# Patient Record
Sex: MALE | Race: OTHER | Employment: FULL TIME | URBAN - METROPOLITAN AREA
[De-identification: names, ages, dates, MRNs, and addresses within clinical notes are randomized per-mention and may not be internally consistent; named-entity substitution may affect disease eponyms.]

---

## 2017-08-03 ENCOUNTER — HOSPITAL ENCOUNTER (OUTPATIENT)
Dept: GENERAL RADIOLOGY | Age: 37
Discharge: HOME | End: 2017-08-03
Attending: FAMILY MEDICINE
Payer: COMMERCIAL

## 2017-08-03 ENCOUNTER — OFFICE VISIT (OUTPATIENT)
Dept: FAMILY MEDICINE CLINIC | Facility: CLINIC | Age: 37
End: 2017-08-03

## 2017-08-03 VITALS
TEMPERATURE: 99 F | HEART RATE: 60 BPM | DIASTOLIC BLOOD PRESSURE: 79 MMHG | SYSTOLIC BLOOD PRESSURE: 124 MMHG | WEIGHT: 167 LBS

## 2017-08-03 DIAGNOSIS — M54.41 RIGHT-SIDED LOW BACK PAIN WITH RIGHT-SIDED SCIATICA, UNSPECIFIED CHRONICITY: ICD-10-CM

## 2017-08-03 DIAGNOSIS — J02.0 STREP THROAT: Primary | ICD-10-CM

## 2017-08-03 LAB
CONTROL LINE PRESENT WITH A CLEAR BACKGROUND (YES/NO): YES YES/NO
KIT LOT #: NORMAL NUMERIC
STREP GRP A CUL-SCR: POSITIVE

## 2017-08-03 PROCEDURE — 87880 STREP A ASSAY W/OPTIC: CPT | Performed by: FAMILY MEDICINE

## 2017-08-03 PROCEDURE — 99212 OFFICE O/P EST SF 10 MIN: CPT | Performed by: FAMILY MEDICINE

## 2017-08-03 PROCEDURE — 99203 OFFICE O/P NEW LOW 30 MIN: CPT | Performed by: FAMILY MEDICINE

## 2017-08-03 PROCEDURE — 72100 X-RAY EXAM L-S SPINE 2/3 VWS: CPT | Performed by: FAMILY MEDICINE

## 2017-08-03 RX ORDER — PENICILLIN V POTASSIUM 500 MG/1
500 TABLET ORAL 3 TIMES DAILY
Qty: 30 TABLET | Refills: 0 | Status: SHIPPED | OUTPATIENT
Start: 2017-08-03 | End: 2017-11-15

## 2017-08-03 RX ORDER — METHYLPREDNISOLONE 4 MG/1
TABLET ORAL
Qty: 1 KIT | Refills: 1 | Status: SHIPPED | OUTPATIENT
Start: 2017-08-03 | End: 2017-11-15

## 2017-08-03 NOTE — PROGRESS NOTES
Blood pressure 124/79, pulse 60, temperature 99.1 °F (37.3 °C), temperature source Oral, weight 167 lb (75.8 kg). Patient presents today complaining of a sore throat for 2 weeks. Some cough. No nasal congestion. Denies allergies.     Patient also comp

## 2017-08-07 ENCOUNTER — TELEPHONE (OUTPATIENT)
Dept: FAMILY MEDICINE CLINIC | Facility: CLINIC | Age: 37
End: 2017-08-07

## 2017-08-07 NOTE — TELEPHONE ENCOUNTER
----- Message from Sarah Reyes DO sent at 8/3/2017  5:57 PM CDT -----  Back x-ray normal patient to fu if no improvement with medication/PT.

## 2017-11-10 ENCOUNTER — TELEPHONE (OUTPATIENT)
Dept: FAMILY MEDICINE CLINIC | Facility: CLINIC | Age: 37
End: 2017-11-10

## 2017-11-10 DIAGNOSIS — K64.9 HEMORRHOIDS, UNSPECIFIED HEMORRHOID TYPE: Primary | ICD-10-CM

## 2017-11-15 ENCOUNTER — OFFICE VISIT (OUTPATIENT)
Dept: FAMILY MEDICINE CLINIC | Facility: CLINIC | Age: 37
End: 2017-11-15

## 2017-11-15 ENCOUNTER — TELEPHONE (OUTPATIENT)
Dept: OTHER | Age: 37
End: 2017-11-15

## 2017-11-15 VITALS
DIASTOLIC BLOOD PRESSURE: 75 MMHG | WEIGHT: 169 LBS | SYSTOLIC BLOOD PRESSURE: 121 MMHG | HEART RATE: 53 BPM | HEIGHT: 64 IN | BODY MASS INDEX: 28.85 KG/M2

## 2017-11-15 DIAGNOSIS — Z00.00 ROUTINE GENERAL MEDICAL EXAMINATION AT A HEALTH CARE FACILITY: Primary | ICD-10-CM

## 2017-11-15 DIAGNOSIS — R06.02 SHORTNESS OF BREATH: ICD-10-CM

## 2017-11-15 DIAGNOSIS — K64.9 HEMORRHOIDS, UNSPECIFIED HEMORRHOID TYPE: Primary | ICD-10-CM

## 2017-11-15 DIAGNOSIS — G89.29 CHRONIC BILATERAL THORACIC BACK PAIN: ICD-10-CM

## 2017-11-15 DIAGNOSIS — R53.83 FATIGUE, UNSPECIFIED TYPE: ICD-10-CM

## 2017-11-15 DIAGNOSIS — M54.6 CHRONIC BILATERAL THORACIC BACK PAIN: ICD-10-CM

## 2017-11-15 PROCEDURE — 99395 PREV VISIT EST AGE 18-39: CPT | Performed by: PHYSICIAN ASSISTANT

## 2017-11-15 PROCEDURE — G0438 PPPS, INITIAL VISIT: HCPCS | Performed by: PHYSICIAN ASSISTANT

## 2017-11-15 RX ORDER — CYCLOBENZAPRINE HCL 10 MG
10 TABLET ORAL 3 TIMES DAILY
Qty: 30 TABLET | Refills: 0 | Status: SHIPPED | OUTPATIENT
Start: 2017-11-15 | End: 2017-12-05

## 2017-11-15 NOTE — TELEPHONE ENCOUNTER
Please note/advise. Thank you. Please respond to pool: WYATT PROCTORO LPN/MC    Pt wife Dmitry Field) calling on pt's behalf (No HIPAA on file) stating that the pt saw the GI specialist and was told that the pt should get a surgical consult for his hemorrhoids.     Thamas Necessary

## 2017-11-15 NOTE — PROGRESS NOTES
HPI:    Patient ID: Alberta Andersen is a 40year old male. Patient presents for physical exam.  He complains of feeling intermittent shortness of breath 2-3 times per day that lasts very briefly for past one week. He states feels this in his throat.   No Positive for back pain and neck pain. Negative for arthralgias, gait problem, joint swelling and myalgias. Allergic/Immunologic: Negative for environmental allergies. Neurological: Negative for dizziness, weakness, numbness and headaches.    Psychiatric thought content normal.   Vitals reviewed. ASSESSMENT/PLAN:   Routine general medical examination at a health care facility  (primary encounter diagnosis)  -Screening labs ordered.     Chronic bilateral thoracic back pain  -Flexeril 10 mg TID pr

## 2018-01-05 ENCOUNTER — OFFICE VISIT (OUTPATIENT)
Dept: SURGERY | Facility: CLINIC | Age: 38
End: 2018-01-05

## 2018-01-05 VITALS — BODY MASS INDEX: 26.83 KG/M2 | WEIGHT: 163 LBS | HEIGHT: 65.5 IN

## 2018-01-05 DIAGNOSIS — K64.8 HEMORRHOIDS, INTERNAL, WITH BLEEDING: Primary | ICD-10-CM

## 2018-01-05 PROCEDURE — 46600 DIAGNOSTIC ANOSCOPY SPX: CPT | Performed by: SURGERY

## 2018-01-05 PROCEDURE — 99212 OFFICE O/P EST SF 10 MIN: CPT | Performed by: SURGERY

## 2018-01-05 PROCEDURE — 99204 OFFICE O/P NEW MOD 45 MIN: CPT | Performed by: SURGERY

## 2018-01-05 RX ORDER — LIDOCAINE 50 MG/G
1 CREAM RECTAL AS NEEDED
Qty: 45 G | Refills: 1 | Status: SHIPPED | OUTPATIENT
Start: 2018-01-05 | End: 2018-01-06 | Stop reason: ALTCHOICE

## 2018-01-05 NOTE — PROGRESS NOTES
Visit Note    Active Problems   Hemorrhoids, internal, with bleeding and pain.  (primary encounter diagnosis)  Chief Complaint: Patient presents with:  Hemorrhoids: Pt. referred by Dr. Vandana Rod c/o hemorrhoids.   Pt. states via interpretor, he has had Eyes: Negative. Respiratory: Positive for cough, shortness of breath and wheezing. Cardiovascular: Negative. Gastrointestinal: Positive for blood in stool and constipation. Genitourinary: Negative. Musculoskeletal: Negative.     Skin: Tiffanie Aus normal. Judgment and thought content normal.        Assessment   Hemorrhoids, internal, with bleeding and pain.  (primary encounter diagnosis)    Plan                   Anoscopy was done with difficulty due to patient's discomfort.  [address] Internal Hemorrho

## 2018-01-06 ENCOUNTER — OFFICE VISIT (OUTPATIENT)
Dept: FAMILY MEDICINE CLINIC | Facility: CLINIC | Age: 38
End: 2018-01-06

## 2018-01-06 ENCOUNTER — HOSPITAL ENCOUNTER (OUTPATIENT)
Dept: GENERAL RADIOLOGY | Age: 38
Discharge: HOME | End: 2018-01-06
Attending: FAMILY MEDICINE
Payer: COMMERCIAL

## 2018-01-06 VITALS
DIASTOLIC BLOOD PRESSURE: 78 MMHG | HEART RATE: 78 BPM | WEIGHT: 163 LBS | BODY MASS INDEX: 27 KG/M2 | TEMPERATURE: 99 F | SYSTOLIC BLOOD PRESSURE: 111 MMHG

## 2018-01-06 DIAGNOSIS — R05.9 COUGH: ICD-10-CM

## 2018-01-06 DIAGNOSIS — R05.9 COUGH: Primary | ICD-10-CM

## 2018-01-06 DIAGNOSIS — J11.1 INFLUENZA: ICD-10-CM

## 2018-01-06 LAB
CONTROL LINE PRESENT WITH A CLEAR BACKGROUND (YES/NO): YES YES/NO
FLUAV + FLUBV RNA SPEC NAA+PROBE: NEGATIVE
FLUAV + FLUBV RNA SPEC NAA+PROBE: NEGATIVE
FLUAV + FLUBV RNA SPEC NAA+PROBE: POSITIVE
KIT LOT #: NORMAL NUMERIC
STREP GRP A CUL-SCR: NEGATIVE

## 2018-01-06 PROCEDURE — 99213 OFFICE O/P EST LOW 20 MIN: CPT | Performed by: FAMILY MEDICINE

## 2018-01-06 PROCEDURE — 99212 OFFICE O/P EST SF 10 MIN: CPT | Performed by: FAMILY MEDICINE

## 2018-01-06 PROCEDURE — 71046 X-RAY EXAM CHEST 2 VIEWS: CPT | Performed by: FAMILY MEDICINE

## 2018-01-06 PROCEDURE — 87880 STREP A ASSAY W/OPTIC: CPT | Performed by: FAMILY MEDICINE

## 2018-01-06 RX ORDER — HYDROCODONE POLISTIREX AND CHLORPHENIRAMINE POLISTIREX 10; 8 MG/5ML; MG/5ML
5 SUSPENSION, EXTENDED RELEASE ORAL 2 TIMES DAILY PRN
Qty: 140 ML | Refills: 0 | Status: SHIPPED | OUTPATIENT
Start: 2018-01-06 | End: 2020-11-13

## 2018-01-06 RX ORDER — AMOXICILLIN AND CLAVULANATE POTASSIUM 875; 125 MG/1; MG/1
1 TABLET, FILM COATED ORAL 2 TIMES DAILY
Qty: 20 TABLET | Refills: 0 | Status: SHIPPED | OUTPATIENT
Start: 2018-01-06 | End: 2018-01-16

## 2018-01-06 NOTE — PROCEDURES
Patient in prone position. The anus and perineum were prepped and draped in the usual manner. Lidocaine 5% used as topical anesthesia. Anus was gently dilated and rectal digital exam performed .: no masses/pathology. Significant spasm/scar tissue?   anoscop

## 2018-01-06 NOTE — PROGRESS NOTES
Blood pressure 111/78, pulse 78, temperature 98.5 °F (36.9 °C), temperature source Oral, weight 163 lb (73.9 kg).     Patient presents today complaining of a five to 6 day history of coughing of green phlegm nocturnal cough as the chills no fever green nasa

## 2018-01-08 ENCOUNTER — TELEPHONE (OUTPATIENT)
Dept: FAMILY MEDICINE CLINIC | Facility: CLINIC | Age: 38
End: 2018-01-08

## 2018-01-08 NOTE — TELEPHONE ENCOUNTER
please notify patient he was positive for influenza. No additional treatment is necessary. Call if there is any worsening shortness of breath. Patient is contagious.

## 2018-01-08 NOTE — TELEPHONE ENCOUNTER
Reference lab calling with positive influenza results. No tamiflu prescribed. Please advise on message. [facility] is out of the office results sent to [facility].     Collected:  1/6/2018  1:19 PM Status:  Final result Dx:  Cough   Specimen Information: Nasopharyngeal s

## 2018-09-18 ENCOUNTER — OFFICE VISIT (OUTPATIENT)
Dept: FAMILY MEDICINE CLINIC | Facility: CLINIC | Age: 38
End: 2018-09-18

## 2018-09-18 VITALS
DIASTOLIC BLOOD PRESSURE: 78 MMHG | BODY MASS INDEX: 28.15 KG/M2 | HEART RATE: 80 BPM | HEIGHT: 65.5 IN | WEIGHT: 171 LBS | SYSTOLIC BLOOD PRESSURE: 117 MMHG

## 2018-09-18 DIAGNOSIS — S61.412D: Primary | ICD-10-CM

## 2018-09-18 PROCEDURE — 99212 OFFICE O/P EST SF 10 MIN: CPT | Performed by: FAMILY MEDICINE

## 2018-09-18 NOTE — PROGRESS NOTES
Blood pressure 117/78, pulse 80, height 5' 5.5\" (1.664 m), weight 171 lb (77.6 kg). Patient presents today following up for suture removal.  On September 6 he injured his left hand accidentally with a knife. Sutures were placed the same day.   X-ray wa

## 2019-04-01 ENCOUNTER — TELEPHONE (OUTPATIENT)
Dept: SCHEDULING | Age: 39
End: 2019-04-01

## 2019-04-18 ENCOUNTER — OFFICE VISIT (OUTPATIENT)
Dept: FAMILY MEDICINE | Age: 39
End: 2019-04-18

## 2019-04-18 DIAGNOSIS — R10.30 LOWER ABDOMINAL PAIN: ICD-10-CM

## 2019-04-18 DIAGNOSIS — K21.9 GASTROESOPHAGEAL REFLUX DISEASE, ESOPHAGITIS PRESENCE NOT SPECIFIED: Primary | ICD-10-CM

## 2019-04-18 PROCEDURE — 99204 OFFICE O/P NEW MOD 45 MIN: CPT | Performed by: FAMILY MEDICINE

## 2019-04-18 RX ORDER — PANTOPRAZOLE SODIUM 40 MG/1
40 TABLET, DELAYED RELEASE ORAL DAILY
Qty: 30 TABLET | Refills: 0 | Status: SHIPPED | OUTPATIENT
Start: 2019-04-18

## 2019-04-18 ASSESSMENT — ENCOUNTER SYMPTOMS
EYES NEGATIVE: 1
DIARRHEA: 0
FEVER: 0
NUMBNESS: 0
HALLUCINATIONS: 0
ADENOPATHY: 0
FACIAL ASYMMETRY: 0
COUGH: 0
SINUS PRESSURE: 0
HEADACHES: 0
CONFUSION: 0
WHEEZING: 0
WOUND: 0
SORE THROAT: 0
SINUS PAIN: 0
APPETITE CHANGE: 0
CHEST TIGHTNESS: 0
TROUBLE SWALLOWING: 0
ANAL BLEEDING: 0
VOMITING: 0
SEIZURES: 0
BLOOD IN STOOL: 0
AGITATION: 0
FACIAL SWELLING: 0
NAUSEA: 0
NERVOUS/ANXIOUS: 0
LIGHT-HEADEDNESS: 0
WEAKNESS: 0
CHILLS: 0
ABDOMINAL PAIN: 1
SLEEP DISTURBANCE: 0
RHINORRHEA: 0
CONSTIPATION: 0
FATIGUE: 0
DIZZINESS: 0
SHORTNESS OF BREATH: 0
SPEECH DIFFICULTY: 0
ACTIVITY CHANGE: 0
ABDOMINAL DISTENTION: 1

## 2019-04-18 ASSESSMENT — PAIN SCALES - GENERAL: PAINLEVEL: 0

## 2019-06-02 DIAGNOSIS — K21.9 GASTROESOPHAGEAL REFLUX DISEASE, ESOPHAGITIS PRESENCE NOT SPECIFIED: ICD-10-CM

## 2019-06-03 RX ORDER — PANTOPRAZOLE SODIUM 40 MG/1
40 TABLET, DELAYED RELEASE ORAL DAILY
Qty: 90 TABLET | Refills: 0 | OUTPATIENT
Start: 2019-06-03

## 2020-11-02 ENCOUNTER — OFFICE VISIT (OUTPATIENT)
Dept: INTERNAL MEDICINE CLINIC | Facility: CLINIC | Age: 40
End: 2020-11-02
Payer: MEDICAID

## 2020-11-02 VITALS
BODY MASS INDEX: 27.82 KG/M2 | DIASTOLIC BLOOD PRESSURE: 76 MMHG | TEMPERATURE: 98 F | HEART RATE: 61 BPM | HEIGHT: 65.5 IN | WEIGHT: 169 LBS | SYSTOLIC BLOOD PRESSURE: 120 MMHG

## 2020-11-02 DIAGNOSIS — K21.9 GASTROESOPHAGEAL REFLUX DISEASE, UNSPECIFIED WHETHER ESOPHAGITIS PRESENT: Primary | ICD-10-CM

## 2020-11-02 DIAGNOSIS — R13.19 ESOPHAGEAL DYSPHAGIA: ICD-10-CM

## 2020-11-02 PROBLEM — J11.1 INFLUENZA: Status: RESOLVED | Noted: 2018-01-06 | Resolved: 2020-11-02

## 2020-11-02 PROBLEM — R05.9 COUGH: Status: RESOLVED | Noted: 2018-01-06 | Resolved: 2020-11-02

## 2020-11-02 PROCEDURE — 3074F SYST BP LT 130 MM HG: CPT | Performed by: INTERNAL MEDICINE

## 2020-11-02 PROCEDURE — 3008F BODY MASS INDEX DOCD: CPT | Performed by: INTERNAL MEDICINE

## 2020-11-02 PROCEDURE — 3078F DIAST BP <80 MM HG: CPT | Performed by: INTERNAL MEDICINE

## 2020-11-02 PROCEDURE — 99203 OFFICE O/P NEW LOW 30 MIN: CPT | Performed by: INTERNAL MEDICINE

## 2020-11-02 RX ORDER — OMEPRAZOLE 40 MG/1
40 CAPSULE, DELAYED RELEASE ORAL DAILY
Qty: 90 CAPSULE | Refills: 0 | Status: SHIPPED | OUTPATIENT
Start: 2020-11-02 | End: 2020-11-13

## 2020-11-02 RX ORDER — PANTOPRAZOLE SODIUM 40 MG/1
40 TABLET, DELAYED RELEASE ORAL DAILY
COMMUNITY
Start: 2019-04-18 | End: 2020-11-02

## 2020-11-02 NOTE — PROGRESS NOTES
HPI:    Patient ID: Mason Paulino is a 36year old male. Gastro-esophageal Reflux  He complains of abdominal pain, dysphagia and heartburn. This is a chronic (more than a year) problem.  The current episode started more than 1 year ago (at least 1 1/2 y status: Light Tobacco Smoker      Smokeless tobacco: Current User      Tobacco comment: very rare    Alcohol use: Yes      Comment: occ    Drug use: Not on file       PHYSICAL EXAM:    Physical Exam   Constitutional: He is oriented to person, place, and ti food triggers. Pt will need to see gastro since he is having intermittent dysphagia which I told pt will need to have EGD done. I gave him referral to see Dr Joseph Bui. Pt agreed . (R13.10) Esophageal dysphagia  Plan: GASTRO - INTERNAL        See above.

## 2020-11-13 ENCOUNTER — OFFICE VISIT (OUTPATIENT)
Dept: INTERNAL MEDICINE CLINIC | Facility: CLINIC | Age: 40
End: 2020-11-13
Payer: MEDICAID

## 2020-11-13 ENCOUNTER — LAB ENCOUNTER (OUTPATIENT)
Dept: LAB | Age: 40
End: 2020-11-13
Attending: INTERNAL MEDICINE
Payer: MEDICAID

## 2020-11-13 VITALS
BODY MASS INDEX: 27.99 KG/M2 | HEIGHT: 65 IN | DIASTOLIC BLOOD PRESSURE: 72 MMHG | SYSTOLIC BLOOD PRESSURE: 111 MMHG | WEIGHT: 168 LBS | TEMPERATURE: 98 F | HEART RATE: 60 BPM

## 2020-11-13 DIAGNOSIS — Z00.00 ANNUAL PHYSICAL EXAM: Primary | ICD-10-CM

## 2020-11-13 DIAGNOSIS — Z00.00 ANNUAL PHYSICAL EXAM: ICD-10-CM

## 2020-11-13 DIAGNOSIS — K21.9 GASTROESOPHAGEAL REFLUX DISEASE, UNSPECIFIED WHETHER ESOPHAGITIS PRESENT: ICD-10-CM

## 2020-11-13 PROCEDURE — 80053 COMPREHEN METABOLIC PANEL: CPT

## 2020-11-13 PROCEDURE — 85025 COMPLETE CBC W/AUTO DIFF WBC: CPT

## 2020-11-13 PROCEDURE — 3008F BODY MASS INDEX DOCD: CPT | Performed by: INTERNAL MEDICINE

## 2020-11-13 PROCEDURE — 3074F SYST BP LT 130 MM HG: CPT | Performed by: INTERNAL MEDICINE

## 2020-11-13 PROCEDURE — 80061 LIPID PANEL: CPT

## 2020-11-13 PROCEDURE — 99396 PREV VISIT EST AGE 40-64: CPT | Performed by: INTERNAL MEDICINE

## 2020-11-13 PROCEDURE — 36415 COLL VENOUS BLD VENIPUNCTURE: CPT

## 2020-11-13 PROCEDURE — 3078F DIAST BP <80 MM HG: CPT | Performed by: INTERNAL MEDICINE

## 2020-11-13 PROCEDURE — 81003 URINALYSIS AUTO W/O SCOPE: CPT

## 2020-11-13 NOTE — PROGRESS NOTES
HPI:    Patient ID: Carina Shields is a 36year old male. HPI    Review of Systems   Constitutional: Negative. HENT: Negative. Eyes: Negative. Respiratory: Negative. Cardiovascular: Negative. Gastrointestinal: Negative. Skin: Negative. are normal. He exhibits no distension and no mass. There is no abdominal tenderness. There is no rebound and no guarding. Musculoskeletal: Normal range of motion. General: No edema. Lymphadenopathy:     He has no cervical adenopathy.    Neurolog

## 2020-12-14 ENCOUNTER — TELEPHONE (OUTPATIENT)
Dept: INTERNAL MEDICINE CLINIC | Facility: CLINIC | Age: 40
End: 2020-12-14

## 2020-12-14 ENCOUNTER — HOSPITAL ENCOUNTER (OUTPATIENT)
Dept: GENERAL RADIOLOGY | Age: 40
Discharge: HOME | End: 2020-12-14
Attending: INTERNAL MEDICINE
Payer: MEDICAID

## 2020-12-14 ENCOUNTER — OFFICE VISIT (OUTPATIENT)
Dept: INTERNAL MEDICINE CLINIC | Facility: CLINIC | Age: 40
End: 2020-12-14
Payer: MEDICAID

## 2020-12-14 VITALS
WEIGHT: 169 LBS | TEMPERATURE: 97 F | RESPIRATION RATE: 12 BRPM | BODY MASS INDEX: 28.16 KG/M2 | DIASTOLIC BLOOD PRESSURE: 83 MMHG | HEIGHT: 65 IN | HEART RATE: 55 BPM | SYSTOLIC BLOOD PRESSURE: 125 MMHG

## 2020-12-14 DIAGNOSIS — G89.29 CHRONIC BILATERAL LOW BACK PAIN WITHOUT SCIATICA: Primary | ICD-10-CM

## 2020-12-14 DIAGNOSIS — M54.50 CHRONIC BILATERAL LOW BACK PAIN WITHOUT SCIATICA: Primary | ICD-10-CM

## 2020-12-14 DIAGNOSIS — E78.00 HYPERCHOLESTEREMIA: ICD-10-CM

## 2020-12-14 DIAGNOSIS — M54.50 CHRONIC BILATERAL LOW BACK PAIN WITHOUT SCIATICA: ICD-10-CM

## 2020-12-14 DIAGNOSIS — K21.9 GASTROESOPHAGEAL REFLUX DISEASE, UNSPECIFIED WHETHER ESOPHAGITIS PRESENT: ICD-10-CM

## 2020-12-14 DIAGNOSIS — G89.29 CHRONIC BILATERAL LOW BACK PAIN WITHOUT SCIATICA: ICD-10-CM

## 2020-12-14 PROCEDURE — 3074F SYST BP LT 130 MM HG: CPT | Performed by: INTERNAL MEDICINE

## 2020-12-14 PROCEDURE — 3079F DIAST BP 80-89 MM HG: CPT | Performed by: INTERNAL MEDICINE

## 2020-12-14 PROCEDURE — 72110 X-RAY EXAM L-2 SPINE 4/>VWS: CPT | Performed by: INTERNAL MEDICINE

## 2020-12-14 PROCEDURE — 3008F BODY MASS INDEX DOCD: CPT | Performed by: INTERNAL MEDICINE

## 2020-12-14 PROCEDURE — 99214 OFFICE O/P EST MOD 30 MIN: CPT | Performed by: INTERNAL MEDICINE

## 2020-12-14 RX ORDER — OMEPRAZOLE 20 MG/1
20 CAPSULE, DELAYED RELEASE ORAL DAILY
COMMUNITY
Start: 2020-11-11 | End: 2021-03-10

## 2020-12-14 NOTE — PROGRESS NOTES
HPI:    Patient ID: Yvette Snowden is a 36year old male. Patient presents today with her sister to discuss recent labs that was done on previous physical.  He also had seen GI and underwent with a EGD for his GERD.     Low Back Pain  This is a chronic p use: Yes      Comment: occ    Drug use: Never       PHYSICAL EXAM:    Physical Exam   Constitutional: He is oriented to person, place, and time. He appears well-developed and well-nourished. No distress. HENT:   Head: Normocephalic and atraumatic.    Righ CANCELED: XR LUMBAR SPINE (MIN 2 VIEWS)         (CPT=72100)        Has been going on for about almost 6 months. Has been no trauma however does a lot of lifting at work. We will get an x-ray of his lumbar spine.   If this is negative we will send him for

## 2021-02-10 ENCOUNTER — HOSPITAL ENCOUNTER (OUTPATIENT)
Dept: GENERAL RADIOLOGY | Age: 41
Discharge: HOME | End: 2021-02-10
Attending: INTERNAL MEDICINE
Payer: MEDICAID

## 2021-02-10 ENCOUNTER — OFFICE VISIT (OUTPATIENT)
Dept: INTERNAL MEDICINE CLINIC | Facility: CLINIC | Age: 41
End: 2021-02-10
Payer: MEDICAID

## 2021-02-10 VITALS
HEART RATE: 52 BPM | BODY MASS INDEX: 28.85 KG/M2 | RESPIRATION RATE: 12 BRPM | WEIGHT: 169 LBS | TEMPERATURE: 98 F | HEIGHT: 64 IN | DIASTOLIC BLOOD PRESSURE: 78 MMHG | SYSTOLIC BLOOD PRESSURE: 120 MMHG

## 2021-02-10 DIAGNOSIS — M25.532 LEFT WRIST PAIN: ICD-10-CM

## 2021-02-10 DIAGNOSIS — M79.632 LEFT FOREARM PAIN: ICD-10-CM

## 2021-02-10 DIAGNOSIS — M79.632 LEFT FOREARM PAIN: Primary | ICD-10-CM

## 2021-02-10 PROCEDURE — 73110 X-RAY EXAM OF WRIST: CPT | Performed by: INTERNAL MEDICINE

## 2021-02-10 PROCEDURE — 99214 OFFICE O/P EST MOD 30 MIN: CPT | Performed by: INTERNAL MEDICINE

## 2021-02-10 PROCEDURE — 73090 X-RAY EXAM OF FOREARM: CPT | Performed by: INTERNAL MEDICINE

## 2021-02-10 PROCEDURE — 3078F DIAST BP <80 MM HG: CPT | Performed by: INTERNAL MEDICINE

## 2021-02-10 PROCEDURE — 3074F SYST BP LT 130 MM HG: CPT | Performed by: INTERNAL MEDICINE

## 2021-02-10 PROCEDURE — 3008F BODY MASS INDEX DOCD: CPT | Performed by: INTERNAL MEDICINE

## 2021-02-10 NOTE — PROGRESS NOTES
HPI:    Patient ID: Nigel Etienne is a 36year old male. Arm Pain   The pain is present in the left wrist (left forearm). This is a new problem. The current episode started in the past 7 days.  There has been a history of trauma (pt fell on on his left Right Ear: External ear normal.   Left Ear: External ear normal.   Eyes: Conjunctivae are normal. Right eye exhibits no discharge. Left eye exhibits no discharge. No scleral icterus. Neck: Normal range of motion. Neck supple. No thyromegaly present.    Ca

## 2021-02-11 ENCOUNTER — OFFICE VISIT (OUTPATIENT)
Dept: ORTHOPEDICS CLINIC | Facility: CLINIC | Age: 41
End: 2021-02-11
Payer: MEDICAID

## 2021-02-11 ENCOUNTER — TELEPHONE (OUTPATIENT)
Dept: INTERNAL MEDICINE CLINIC | Facility: CLINIC | Age: 41
End: 2021-02-11

## 2021-02-11 VITALS — WEIGHT: 169 LBS | HEIGHT: 64 IN | BODY MASS INDEX: 28.85 KG/M2

## 2021-02-11 DIAGNOSIS — S52.232A CLOSED DISPLACED OBLIQUE FRACTURE OF SHAFT OF LEFT ULNA, INITIAL ENCOUNTER: Primary | ICD-10-CM

## 2021-02-11 DIAGNOSIS — S52.252A CLOSED DISPLACED COMMINUTED FRACTURE OF SHAFT OF LEFT ULNA, INITIAL ENCOUNTER: Primary | ICD-10-CM

## 2021-02-11 PROCEDURE — 25530 CLTX ULNAR SHFT FX W/O MNPJ: CPT | Performed by: ORTHOPAEDIC SURGERY

## 2021-02-11 PROCEDURE — 3008F BODY MASS INDEX DOCD: CPT | Performed by: ORTHOPAEDIC SURGERY

## 2021-02-11 PROCEDURE — 99204 OFFICE O/P NEW MOD 45 MIN: CPT | Performed by: ORTHOPAEDIC SURGERY

## 2021-02-11 RX ORDER — ACETAMINOPHEN 500 MG
500 TABLET ORAL EVERY 6 HOURS PRN
COMMUNITY

## 2021-02-11 NOTE — PROGRESS NOTES
NURSING INTAKE COMMENTS: Patient presents with:  Consult: left ulnar fracture -- XR taken yesterday. Onset abouut 1 week from falling on ice at home. Niece, Atiya Amezcua,  with pt . Rates pain 7-8/10. Right handed.        HPI: This 36year old male presents today denies abdominal pain, nausea, vomiting, diarrhea, constipation, hematochezia, worsening heartburn or stomach ulcers  : denies dysuria, hematuria, incontinence, increased frequency, urgency, difficulty urinating  MUSCULOSKELETAL: denies musculoskeletal c periosteal reaction  at the fracture site. There is no fracture involving the radius. There is no dislocation. SOFT TISSUES: There is mild soft tissue swelling involving the dorsal aspect of the mid forearm. EFFUSION: None visible. OTHER: Negative. 2:31 PM             Labs:  Lab Results   Component Value Date    WBC 6.9 11/13/2020    HGB 16.2 11/13/2020    .0 11/13/2020      Lab Results   Component Value Date    GLU 97 11/13/2020    BUN 13 11/13/2020    CREATSERUM 1.06 11/13/2020    GFRNAA 87

## 2021-02-25 ENCOUNTER — OFFICE VISIT (OUTPATIENT)
Dept: ORTHOPEDICS CLINIC | Facility: CLINIC | Age: 41
End: 2021-02-25
Payer: MEDICAID

## 2021-02-25 ENCOUNTER — HOSPITAL ENCOUNTER (OUTPATIENT)
Dept: GENERAL RADIOLOGY | Facility: HOSPITAL | Age: 41
Discharge: HOME | End: 2021-02-25
Attending: ORTHOPAEDIC SURGERY
Payer: MEDICAID

## 2021-02-25 DIAGNOSIS — Z47.89 ORTHOPEDIC AFTERCARE: ICD-10-CM

## 2021-02-25 DIAGNOSIS — S52.232D CLOSED DISPLACED OBLIQUE FRACTURE OF SHAFT OF LEFT ULNA WITH ROUTINE HEALING, SUBSEQUENT ENCOUNTER: Primary | ICD-10-CM

## 2021-02-25 PROCEDURE — 99024 POSTOP FOLLOW-UP VISIT: CPT | Performed by: ORTHOPAEDIC SURGERY

## 2021-02-25 PROCEDURE — 73090 X-RAY EXAM OF FOREARM: CPT | Performed by: ORTHOPAEDIC SURGERY

## 2021-03-10 RX ORDER — OMEPRAZOLE 40 MG/1
CAPSULE, DELAYED RELEASE ORAL
Qty: 90 CAPSULE | Refills: 0 | Status: SHIPPED | OUTPATIENT
Start: 2021-03-10 | End: 2022-01-11

## 2021-03-19 ENCOUNTER — TELEPHONE (OUTPATIENT)
Dept: INTERNAL MEDICINE CLINIC | Facility: CLINIC | Age: 41
End: 2021-03-19

## 2021-03-19 NOTE — TELEPHONE ENCOUNTER
Prior authorization request received through CoverMyMeds    Prior authorization for Omeprazole completed w/ [employer] on cover my meds Key: [id number], turn around time 3-5 days.

## 2021-03-25 ENCOUNTER — HOSPITAL ENCOUNTER (OUTPATIENT)
Dept: GENERAL RADIOLOGY | Facility: HOSPITAL | Age: 41
Discharge: HOME | End: 2021-03-25
Attending: ORTHOPAEDIC SURGERY
Payer: MEDICAID

## 2021-03-25 ENCOUNTER — OFFICE VISIT (OUTPATIENT)
Dept: ORTHOPEDICS CLINIC | Facility: CLINIC | Age: 41
End: 2021-03-25
Payer: MEDICAID

## 2021-03-25 DIAGNOSIS — Z47.89 ORTHOPEDIC AFTERCARE: ICD-10-CM

## 2021-03-25 DIAGNOSIS — S52.232D CLOSED DISPLACED OBLIQUE FRACTURE OF SHAFT OF LEFT ULNA WITH ROUTINE HEALING, SUBSEQUENT ENCOUNTER: Primary | ICD-10-CM

## 2021-03-25 PROCEDURE — 73110 X-RAY EXAM OF WRIST: CPT | Performed by: ORTHOPAEDIC SURGERY

## 2021-03-25 PROCEDURE — 99024 POSTOP FOLLOW-UP VISIT: CPT | Performed by: ORTHOPAEDIC SURGERY

## 2021-03-25 NOTE — PROGRESS NOTES
NURSING INTAKE COMMENTS: Patient presents with:  Fracture: Left ulna fracture, pain is a 6/10, throbbing sensation. Patient denies any numbness, tingling or swelling of the hand/fingers.  Patient states that his right hand, pinkie finger is swollen and at t palpitations, leg swelling  GI: denies abdominal pain, nausea, vomiting, diarrhea, constipation, hematochezia, worsening heartburn or stomach ulcers  : denies dysuria, hematuria, incontinence, increased frequency, urgency, difficulty urinating  MUSCULOSK formation at site of the fracture. SOFT TISSUES: Negative. No visible soft tissue swelling. EFFUSION: None visible. OTHER: Negative. CONCLUSION:  1. Healing minimally comminuted midshaft ulnar fracture unchanged position.   Progressive healing    Hiral Sanderson

## 2021-05-26 VITALS
WEIGHT: 165.4 LBS | TEMPERATURE: 98.6 F | BODY MASS INDEX: 28.24 KG/M2 | RESPIRATION RATE: 16 BRPM | DIASTOLIC BLOOD PRESSURE: 70 MMHG | HEART RATE: 72 BPM | HEIGHT: 64 IN | OXYGEN SATURATION: 98 % | SYSTOLIC BLOOD PRESSURE: 100 MMHG

## 2021-07-16 ENCOUNTER — OFFICE VISIT (OUTPATIENT)
Dept: INTERNAL MEDICINE CLINIC | Facility: CLINIC | Age: 41
End: 2021-07-16
Payer: MEDICAID

## 2021-07-16 VITALS
TEMPERATURE: 97 F | BODY MASS INDEX: 28.71 KG/M2 | HEIGHT: 64 IN | DIASTOLIC BLOOD PRESSURE: 80 MMHG | HEART RATE: 50 BPM | WEIGHT: 168.19 LBS | SYSTOLIC BLOOD PRESSURE: 128 MMHG

## 2021-07-16 DIAGNOSIS — J01.10 ACUTE NON-RECURRENT FRONTAL SINUSITIS: Primary | ICD-10-CM

## 2021-07-16 DIAGNOSIS — R03.0 ELEVATED BLOOD PRESSURE READING: ICD-10-CM

## 2021-07-16 DIAGNOSIS — R51.9 SINUS HEADACHE: ICD-10-CM

## 2021-07-16 PROCEDURE — 3008F BODY MASS INDEX DOCD: CPT | Performed by: INTERNAL MEDICINE

## 2021-07-16 PROCEDURE — 3074F SYST BP LT 130 MM HG: CPT | Performed by: INTERNAL MEDICINE

## 2021-07-16 PROCEDURE — 99214 OFFICE O/P EST MOD 30 MIN: CPT | Performed by: INTERNAL MEDICINE

## 2021-07-16 PROCEDURE — 3079F DIAST BP 80-89 MM HG: CPT | Performed by: INTERNAL MEDICINE

## 2021-07-16 RX ORDER — FEXOFENADINE HCL 180 MG/1
180 TABLET ORAL NIGHTLY
Qty: 90 TABLET | Refills: 3 | Status: SHIPPED | OUTPATIENT
Start: 2021-07-16

## 2021-07-16 RX ORDER — AMOXICILLIN AND CLAVULANATE POTASSIUM 875; 125 MG/1; MG/1
1 TABLET, FILM COATED ORAL 2 TIMES DAILY
Qty: 20 TABLET | Refills: 0 | Status: SHIPPED | OUTPATIENT
Start: 2021-07-16 | End: 2021-07-26

## 2021-07-16 RX ORDER — FLUTICASONE PROPIONATE 50 MCG
2 SPRAY, SUSPENSION (ML) NASAL 2 TIMES DAILY
Qty: 3 EACH | Refills: 3 | Status: SHIPPED | OUTPATIENT
Start: 2021-07-16 | End: 2022-07-11

## 2021-07-16 NOTE — PROGRESS NOTES
History of Present Illness   Patient ID: Xena Gutiérrez is a 39year old male. Chief Complaint: Blood Pressure (was experiencing headaches and advised to recheck bp)      Blood Pressure  This is a new problem. The current episode started today.  The proble Normal range of motion and neck supple. Skin:     Coloration: Skin is not jaundiced. Neurological:      General: No focal deficit present. Mental Status: He is alert and oriented to person, place, and time. Mental status is at baseline.    Psychiat Never     Reviewed Current Medications:  Current Outpatient Medications   Medication Sig Dispense Refill   • Fluticasone Propionate 50 MCG/ACT Nasal Suspension 2 sprays by Each Nare route 2 (two) times a day.  3 each 3   • Amoxicillin-Pot Clavulanate 875-12 your risk of stroke, heart attack, heart disease, kidney disease, eye disease, and sexual dysfunction. What is High Blood Pressure?   High blood pressure (also called hypertension) is known as the “silent killer.” This is because most of the time it d contracts. Diastolic blood pressure is the lower number. This is the pressure when the heart relaxes between beats.   Blood pressure is categorized as normal, elevated, or stage 1 or stage 2 high blood pressure:  · Normal blood pressure is systolic of less and blood pressure. The American Heart Association’s (AHA) \"ideal\" sodium intake recommendation is 1,500 milligrams per day.   However, since American's eat so much salt, the AHA says a positive change can occur by cutting back to even 2,400 milligrams of These include some asthma inhalers, decongestants, diet pills, and street drugs like cocaine and amphetamine.   Other causes include:  · Weight gain  · More salt in your diet  · Smoking  · Caffeine  Your blood pressure can also rise if you are emotionally u the amount of salt in your diet:  ? Avoid high-salt foods like olives, pickles, smoked meats, and salted potato chips. ? Don’t add salt to your food at the table. ? Use only small amounts of salt when cooking.   · Start an exercise program. Talk with your medical appointments. Here are some additional guidelines on home blood pressure monitoring from the American Heart Association.   · Don't smoke or drink coffee for 30 minutes  · Go to the bathroom before the test.  · Relax for 5 minutes before taking the is required.   · Chest, arm, shoulder, neck, or upper back pain  · Shortness of breath  · Severe headache  · Throbbing or rushing sound in the ears  · Nosebleed  · Extreme drowsiness, confusion, or fainting  · Dizziness or dizziness with spinning sensation Eat 2 to 3 servings of low-fat or fat-free dairy products. · Ask your doctor about the DASH eating plan. This plan helps reduce blood pressure. · When you go to a restaurant, ask that your meal be prepared with no added salt.     Maintain a healthy weight stopping or changing them. Low-Salt Choices  Eating salt (sodium) can make your body retain too much water. Excess water makes your heart work harder. Canned, packaged, and frozen foods are easy to prepare. But they are often high in sodium.  Here are some occur year round. Common indoor allergens include house dust mites, mold, cockroaches, and pet dander. Outdoor allergens include pollen from trees, grasses, and weeds. Symptoms include a drippy, stuffy, and itchy nose.  They also include sneezing and red up as advised by the healthcare provider or our staff. If you were referred to an allergy specialist, make this appointment promptly.   When to seek medical advice  Call your healthcare provider right away if the following occur:  · Coughing or wheezing  · discharge  · Stuffy nose (nasal congestion)  · Drainage down your throat (postnasal drip)  · Sneezing  · Red, watery eyes  · Itchy nose, eyes, ears, and throat  · Plugged-up ears (ear congestion)  · Sore throat  · Coughing  · Sinus pain and swelling  · Hea dogs, birds, horses, and rabbits, are common causes of nasal allergies. Flakes of skin (dander), saliva left on fur when an animal cleans itself, urine in litter boxes and cages, and feathers can all cause nasal allergies.   Irritants make allergies worse a vacuum designed to lessen allergens. · If someone else can’t dust and vacuum for you, wearing a filter mask may help. Reduce indoor humidity  Dust mites need moist air to live. Use a dehumidifier to reduce air moisture.  Don’t use humidifiers, or vapori bleeding  · Should be sprayed into the nose correctly  Make sure you read and follow the instructions on the label.  If you have any questions about these medicines, ask your healthcare provider or pharmacist.    Step-by-Step  Using Nasal Spray    Try not t nosebleed  · signs and symptoms of infection like fever or chills; cough; sore throat  · white patches or sores in the mouth or nose  Side effects that usually do not require medical attention (report to your doctor or health care professional if they cont doctor or health care professional if you are around anyone with measles or chickenpox, or if you develop sores or blisters that do not heal properly. NOTE:This sheet is a summary. It may not cover all possible information.  If you have questions about thi

## 2021-07-16 NOTE — PATIENT INSTRUCTIONS
1. Please purchase a blood pressure monitor, if you don't already own one, and record your blood pressure and heart rate 1-2 times daily on the provided log.  The more values you provide at the end of the month the easier it will be to adjust your medic hard against artery walls. This causes damage to the artery walls and then the formation of scar tissue as it heals. This makes the arteries stiff and weak. Plaque sticks to the scarred tissue narrowing and hardening the arteries.  High blood pressure also artery walls between heartbeats (diastolic). Talk with your healthcare provider to find out what your blood pressure goals should be. Controlling blood pressure  If your blood pressure is too high, work with your doctor on a plan for lowering it.  Below a pressure  · Feeling OK does not mean that blood pressure is under control. Likewise, feeling bad doesn’t mean it’s out of control. The only way to know for sure is to check your pressure regularly.   · Medicine is only one part of controlling high blood pre diastolic of less than 80 (120/80)  · Elevated blood pressure is systolic of 243 to 818 and diastolic less than 80  · Stage 1 high blood pressure is systolic is 826 to 604 or diastolic between 80 to 89  · Stage 2 high blood pressure is when systolic is 296 beverages. · Stop smoking. If you are a long-time smoker, this can be hard. Enroll in a stop-smoking program to make it more likely that you will succeed. Talk with your provider about ways to quit. · Learn how to handle stress better.  This is an importa simply take the monitor with you to your next appointment. · Call your provider if you have several high readings. Don't be frightened by a single high reading, but if you get several high readings, check in with your healthcare provider.   · Note: When bl relaxes between beats.   Blood pressure is categorized as normal, elevated, or stage 1 or stage 2 high blood pressure:  · Normal blood pressure is systolic of less than 260 and diastolic of less than 80 (120/80)  · Elevated blood pressure is systolic of 857 Be active at a moderate to vigorous level of physical activity for at least 40 minutes for a minimum of 3 to 4 days a week. Manage stress  · Make time to relax and enjoy life. Find time to laugh.   · Communicate your concerns with your loved ones and yo with no added salt  Stay away from:  · Lunch or deli meat that is cured or smoked  · Cheese  · Tomato juice and ketchup  · Canned vegetables, soups, and fish not labeled as no-salt-added or reduced sodium  · Packaged gravies and sauces  · Olives, pickles, high, keep windows of your car and home closed. If possible, use an air conditioner instead. · Wear a filter mask when mowing or doing yard work. House dust mites:  · Wash bedding every week in warm water and detergent and dry on a hot setting.   · Cover · Wind-borne pollen  · Mold  · Dust mites  · Furry and feathered animals  · Cockroaches  Normally, allergens are harmless. But when a person has allergies, the body thinks they are harmful. The body then attacks allergens with antibodies.  Antibodies are at (which causes seasonal allergies), house-dust mites, mold, and animals. Other substances, called irritants, can bother the nose and make allergy symptoms worse. Pollen  Plants reproduce by moving tiny grains of pollen from plant to plant.  Some pollen is c humid conditions. House-dust mites are almost impossible to get rid of. But you can keep them under control. Make changes to your home  Some furniture, like sofas and chairs, hold dust mites.  To lessen the problem:  · Choose nonfabric upholstery, like nahomi example, a steroid nasal spray and an antihistamine nasal spray. Antihistamines  Antihistamines block the release of histamine, the substance released by your body that causes many allergy symptoms. They help lessen sneezing, itching, and runny noses.    A directed. Make sure that you are using your nasal spray correctly. After 6 months of daily use for allergies, talk to your doctor or health care professional before using it for a longer time.  Ask your doctor or health care professional if you have any que Avoid exposure to extreme heat, cold, or light. Throw away any unused medicine after the expiration date. What should I tell my health care provider before I take this medicine?   They need to know if you have any of these conditions:  · cataracts  · glauc

## 2022-01-11 ENCOUNTER — TELEPHONE (OUTPATIENT)
Dept: INTERNAL MEDICINE CLINIC | Facility: CLINIC | Age: 42
End: 2022-01-11

## 2022-01-11 ENCOUNTER — TELEMEDICINE (OUTPATIENT)
Dept: INTERNAL MEDICINE CLINIC | Facility: CLINIC | Age: 42
End: 2022-01-11
Payer: MEDICAID

## 2022-01-11 ENCOUNTER — LAB ENCOUNTER (OUTPATIENT)
Dept: LAB | Age: 42
End: 2022-01-11
Attending: NURSE PRACTITIONER
Payer: MEDICAID

## 2022-01-11 DIAGNOSIS — J02.9 SORE THROAT: ICD-10-CM

## 2022-01-11 DIAGNOSIS — J30.9 ALLERGIC RHINITIS, UNSPECIFIED SEASONALITY, UNSPECIFIED TRIGGER: ICD-10-CM

## 2022-01-11 DIAGNOSIS — J02.9 SORE THROAT: Primary | ICD-10-CM

## 2022-01-11 PROCEDURE — 99214 OFFICE O/P EST MOD 30 MIN: CPT | Performed by: NURSE PRACTITIONER

## 2022-01-11 RX ORDER — OMEPRAZOLE 40 MG/1
40 CAPSULE, DELAYED RELEASE ORAL DAILY
Qty: 90 CAPSULE | Refills: 0 | Status: SHIPPED | OUTPATIENT
Start: 2022-01-11 | End: 2022-01-27

## 2022-01-11 RX ORDER — OSELTAMIVIR PHOSPHATE 75 MG/1
75 CAPSULE ORAL 2 TIMES DAILY
Qty: 10 CAPSULE | Refills: 0 | Status: SHIPPED | OUTPATIENT
Start: 2022-01-11 | End: 2022-01-16

## 2022-01-11 NOTE — PROGRESS NOTES
Virtual Telephone Check-In    Sharmila Amaya verbally consents to a Virtual/Telephone Check-In visit on 01/11/22.    # [id number]  Patient understands and accepts financial responsibility for any deductible, co-insurance and/or co-pays associated wit [x]     • Myalgia/chills   Yes []    No [x]       • Sore throat:    Yes [x]      No []      • Headache:     Yes [x]     No []      • Chest pain:     Yes []     No [x]     • Nausea/Vomiting    Yes []     No [x]   • Loss of taste or smell  Yes []     No [x] Olga Machado understands phone evaluation is not a substitute for face-to-face examination or emergency care. Patient advised to go to ER or call 911 for worsening symptoms or acute distress.    AMARA Perez    Please note that the following visit to work, travel, etc.  • Continue to wear a mask around others for 5 additional days. If you have a fever, continue to stay home until your fever resolves.       If You Were Exposed to Someone with COVID-19 Giovanny Shane)    If you:               Have been b high    -Use of allergen filters for furnace; consider air purifier if allergies are significant at night    -Call  If symptoms do not improve, worsen or with other questions or concerns      Other orders  -     Omeprazole 40 MG Oral Capsule Delayed Releas

## 2022-01-11 NOTE — TELEPHONE ENCOUNTER
Patient's sister ( not on DELORES) called to schedule an in person appointment. Patient is having symptoms of \"sore throat and stomach pain\". Advised sister that I could not schedule an in person appointment with symptom of sore throat.  Advised immediate car

## 2022-01-13 ENCOUNTER — NURSE TRIAGE (OUTPATIENT)
Dept: INTERNAL MEDICINE CLINIC | Facility: CLINIC | Age: 42
End: 2022-01-13

## 2022-01-13 LAB — SARS-COV-2 RNA RESP QL NAA+PROBE: DETECTED

## 2022-01-13 NOTE — TELEPHONE ENCOUNTER
Action Requested: Summary for Provider     []  Critical Lab, Recommendations Needed  [x] Need Additional Advice  []   FYI    []   Need Orders  [] Need Medications Sent to Pharmacy  []  Other     SUMMARY: Patient states he feels painful little blisters insi

## 2022-01-13 NOTE — TELEPHONE ENCOUNTER
Recommend pt avoid acidic and spicy foods. Avoid sharp foods such as chips, alcoholic beverages. Also recommend trying magic mouth wash swish and spit sent to pharmacy.

## 2022-01-13 NOTE — TELEPHONE ENCOUNTER
Spoke with patient via [address] [facility]  ID# [id number]. Relayed OCTAVIO Pizarro message to patient he voiced understanding. Patient advised to follow CDC guidelines and to call back if s/sx persist or worsen. He states he will  the mouth wash today.

## 2022-01-19 NOTE — TELEPHONE ENCOUNTER
Pt spouse calling back with same request as below. [facility] to give verbal order and pharmacist needs dosage for each medication.

## 2022-01-19 NOTE — TELEPHONE ENCOUNTER
Received call from [facility], pharmacy did not receive rx for magic mouthwash. Attempted to contact Clare in [address] [place] [address], informed that this is a compounded medication and will have to be sent to the [facility] in [facility] that does compounding.   Also need c

## 2022-01-19 NOTE — TELEPHONE ENCOUNTER
Rx called in to [facility] in [facility]. Spouse notified with understanding.

## 2022-01-19 NOTE — TELEPHONE ENCOUNTER
Recommend cont w/ omeprazole and try Magic mouth mouth wash. -RN please contact Sar[facility] to confirm recvd med.

## 2022-01-27 ENCOUNTER — OFFICE VISIT (OUTPATIENT)
Dept: INTERNAL MEDICINE CLINIC | Facility: CLINIC | Age: 42
End: 2022-01-27
Payer: MEDICAID

## 2022-01-27 VITALS
HEART RATE: 69 BPM | HEIGHT: 64 IN | WEIGHT: 160 LBS | BODY MASS INDEX: 27.31 KG/M2 | DIASTOLIC BLOOD PRESSURE: 83 MMHG | SYSTOLIC BLOOD PRESSURE: 119 MMHG

## 2022-01-27 DIAGNOSIS — R10.13 EPIGASTRIC PAIN: ICD-10-CM

## 2022-01-27 DIAGNOSIS — K21.9 GASTROESOPHAGEAL REFLUX DISEASE, UNSPECIFIED WHETHER ESOPHAGITIS PRESENT: Primary | ICD-10-CM

## 2022-01-27 PROCEDURE — 3074F SYST BP LT 130 MM HG: CPT | Performed by: INTERNAL MEDICINE

## 2022-01-27 PROCEDURE — 3008F BODY MASS INDEX DOCD: CPT | Performed by: INTERNAL MEDICINE

## 2022-01-27 PROCEDURE — 3079F DIAST BP 80-89 MM HG: CPT | Performed by: INTERNAL MEDICINE

## 2022-01-27 PROCEDURE — 99213 OFFICE O/P EST LOW 20 MIN: CPT | Performed by: INTERNAL MEDICINE

## 2022-01-27 RX ORDER — PANTOPRAZOLE SODIUM 20 MG/1
TABLET, DELAYED RELEASE ORAL
Qty: 104 TABLET | Refills: 0 | Status: SHIPPED | OUTPATIENT
Start: 2022-01-27 | End: 2022-04-27

## 2022-01-27 RX ORDER — SUCRALFATE ORAL 1 G/10ML
1 SUSPENSION ORAL
Qty: 280 ML | Refills: 0 | Status: SHIPPED | OUTPATIENT
Start: 2022-01-27 | End: 2022-02-03

## 2022-01-27 NOTE — PROGRESS NOTES
History of Present Illness   Patient ID: Sotero Beebe is a 39year old male. Today's Date: 01/27/22  Chief Complaint: Abdominal Pain      Abdominal Pain  This is a new problem. The current episode started 1 to 4 weeks ago.  The onset quality is gradual Constitutional:       General: He is not in acute distress. Appearance: Normal appearance. HENT:      Head: Normocephalic.       Right Ear: External ear normal.      Left Ear: External ear normal.   Eyes:      Extraocular Movements: Extraocular move Oral Tab Take 500 mg by mouth every 6 (six) hours as needed for Pain. (Patient not taking: Reported on 7/16/2021 )         Assessment & Plan    1.  Gastroesophageal reflux disease, unspecified whether esophagitis present (Primary)  -     Pantoprazole Sodium emergency care if necessary. Patient understands and agrees to follow directions and advice. This note was prepared using [employer] voice recognition dictation software. As a result errors may occur.  When identified these errors have been corrected Take all of the medicine until it is finished or your healthcare provider tells you to stop, even if you feel better. · Your healthcare provider may advise you not to take NSAIDs.  If you take daily aspirin for your heart or other medical reasons, do not s LES opens when you swallow. It lets food enter the stomach, then closes quickly. With GERD, the LES doesn’t work normally. It lets food and stomach acid flow back (reflux) into the esophagus.   Some common symptoms  · Frequent heartburn or burping  · Sour-t waist to the top of your head. · Don’t just prop your head on several pillows. This increases pressure on your stomach. It can make GERD worse.   Watch your eating habits  Certain foods may increase the acid in your stomach or relax the lower esophageal sp keep symptoms away. Your provider may prescribe them if antacids don’t work for you. You can buy some of them over the counter. These come in a lower dosage.   Side effects: Confusion in older adults  Proton-pump inhibitors  These also cause the stomach to unless directed by a doctor or healthcare professional. Take your dose at regular intervals every 24 hours. Swallow the tablet whole with a drink of water. Do not crush, break or chew.  This medicine works best if taken on an empty stomach 30 minutes before warfarin  · cyclosporine  · diazepam  · digoxin  · disulfiram  · iron salts  · methotrexate  · mycophenolate mofetil  · phenytoin  · prescription medicine for fungal or yeast infection like itraconazole, ketoconazole, voriconazole  · saquinavir  · tacrolim summary. It may not cover all possible information. If you have questions about this medicine, talk to your doctor, pharmacist, or health care provider.  Copyright© 2019 [employer]  [employer]

## 2022-01-27 NOTE — PATIENT INSTRUCTIONS
Gastritis (Adult)    Gastritis is inflammation and irritation of the stomach lining. You can have it for a short time (acute) or be long lasting (chronic). Infection with bacteria called H pylori most often causes gastritis.  More than a third of people i with your healthcare provider, or as advised by our staff. You may need testing to check for inflammation or an ulcer.   When to seek medical advice  Call your healthcare provider for any of the following:  · Stomach pain that gets worse or moves to the low vomiting  Relieving your discomfort  You and your healthcare provider can work together to find the treatment options that best ease your symptoms. These may include lifestyle changes, medicine, and possibly surgery.   Many people find their GERD symptoms d onions, and tomatoes  · Peppermint  · Any other foods that seem to irritate your stomach or cause you pain  Relieve the pressure  Tips include the following:  · Eat smaller meals, even if you have to eat more often. · Don’t lie down right after you eat.  Zulema Hall They help control GERD symptoms. Side effects: Belly (abdominal) pain, diarrhea, upset stomach (nausea). Possible other side effects linked to long-term use and high doses. Prokinetics  These medicines affect the movement of the digestive tract.  They may pediatrician regarding the use of this medicine in children. Special care may be needed. What side effects may I notice from receiving this medicine?   Side effects that you should report to your doctor or health care professional as soon as possible:  · a between 20 and 25 degrees C (68 and 77 degrees F). Protect from light and moisture. Throw away any unused medicine after the expiration date. What should I tell my health care provider before I take this medicine?   They need to know if you have any of the

## 2022-02-28 ENCOUNTER — HOSPITAL ENCOUNTER (OUTPATIENT)
Dept: ULTRASOUND IMAGING | Age: 42
Discharge: HOME | End: 2022-02-28
Attending: INTERNAL MEDICINE
Payer: MEDICAID

## 2022-02-28 DIAGNOSIS — R10.13 EPIGASTRIC PAIN: ICD-10-CM

## 2022-02-28 PROCEDURE — 76700 US EXAM ABDOM COMPLETE: CPT | Performed by: INTERNAL MEDICINE

## 2022-03-24 RX ORDER — OMEPRAZOLE 40 MG/1
40 CAPSULE, DELAYED RELEASE ORAL DAILY
Qty: 90 CAPSULE | Refills: 0 | OUTPATIENT
Start: 2022-03-24

## 2022-03-24 NOTE — TELEPHONE ENCOUNTER
Pantoprazole Sodium 20 MG Take 1 tablet (20 mg total) by mouth 2 (two) times daily before meals for 14 days, THEN 1 tablet (20 mg total) every morning before breakfast.  Omeprazole was discontinued and changed to pantoprazole    Requested Prescriptions   Pending Prescriptions Disp Refills    OMEPRAZOLE 40 MG Oral Capsule Delayed Release [Pharmacy Med Name: OMEPRAZOLE 40MG CAPSULES] 90 capsule 0     Sig: TAKE 1 CAPSULE(40 MG) BY MOUTH DAILY        Gastrointestional Medication Protocol Passed - 3/24/2022  8:06 AM        Passed - Appointment in past 12 or next 3 months              Recent Outpatient Visits              1 month ago Gastroesophageal reflux disease, unspecified whether esophagitis present    [facility], Yoon Mckoen MD    Office Visit    2 months ago Sore throat    [facility], [facility], [id number] APRN    Telemedicine    8 months ago Acute non-recurrent frontal sinusitis    [facility], [facility], [address], Yoon Mckeon MD    Office Visit    12 months ago Closed displaced oblique fracture of shaft of left ulna with routine healing, subsequent encounter    [facility] Boogie Zimmerman MD    Office Visit    1 year ago Closed displaced oblique fracture of shaft of left ulna with routine healing, subsequent encounter    [facility]    Office Visit            Future Appointments         Provider Department Appt Notes    In 4 days Mario Gary [facility], P[place] White follow up test results

## 2022-06-14 ENCOUNTER — OFFICE VISIT (OUTPATIENT)
Dept: INTERNAL MEDICINE CLINIC | Facility: CLINIC | Age: 42
End: 2022-06-14
Payer: MEDICAID

## 2022-06-14 VITALS
WEIGHT: 170 LBS | SYSTOLIC BLOOD PRESSURE: 112 MMHG | OXYGEN SATURATION: 99 % | BODY MASS INDEX: 29.02 KG/M2 | HEART RATE: 70 BPM | HEIGHT: 64 IN | DIASTOLIC BLOOD PRESSURE: 82 MMHG

## 2022-06-14 DIAGNOSIS — H60.332 ACUTE SWIMMER'S EAR OF LEFT SIDE: Primary | ICD-10-CM

## 2022-06-14 PROCEDURE — 99213 OFFICE O/P EST LOW 20 MIN: CPT | Performed by: NURSE PRACTITIONER

## 2022-06-14 PROCEDURE — 3079F DIAST BP 80-89 MM HG: CPT | Performed by: NURSE PRACTITIONER

## 2022-06-14 PROCEDURE — 3074F SYST BP LT 130 MM HG: CPT | Performed by: NURSE PRACTITIONER

## 2022-06-14 PROCEDURE — 3008F BODY MASS INDEX DOCD: CPT | Performed by: NURSE PRACTITIONER

## 2022-06-14 RX ORDER — NEOMYCIN SULFATE, POLYMYXIN B SULFATE, HYDROCORTISONE 3.5; 10000; 1 MG/ML; [USP'U]/ML; MG/ML
SOLUTION/ DROPS AURICULAR (OTIC)
Qty: 10 ML | Refills: 0 | Status: SHIPPED | OUTPATIENT
Start: 2022-06-14

## 2024-11-19 ENCOUNTER — NURSE TRIAGE (OUTPATIENT)
Dept: TELEHEALTH | Age: 44
End: 2024-11-19

## 2024-11-20 ENCOUNTER — APPOINTMENT (OUTPATIENT)
Dept: URGENT CARE | Age: 44
End: 2024-11-20

## (undated) NOTE — LETTER
12/15/17        Tigist Hinojosa  [address] [facility]      Dear Tejinder Dickinson,    [address] records indicate that you have outstanding lab work and or testing that was ordered for you and has not yet been completed:          CBC W Differential W Platelet

## (undated) NOTE — LETTER
06/18/18        Sam Sewell  [address] [facility]      Dear Ana Rosa Torres,    [address] [facility] records indicate that you have outstanding lab work and or testing that was ordered for you and has not yet been completed:          CBC W Differential W Platelet

## (undated) NOTE — LETTER
04/16/18        Mana Steward  [address] [facility]      Dear Germain Galdamez,    [address] [facility] records indicate that you have outstanding lab work and or testing that was ordered for you and has not yet been completed:          CBC W Differential W Platelet